# Patient Record
Sex: MALE | Race: WHITE
[De-identification: names, ages, dates, MRNs, and addresses within clinical notes are randomized per-mention and may not be internally consistent; named-entity substitution may affect disease eponyms.]

---

## 2020-09-02 ENCOUNTER — HOSPITAL ENCOUNTER (OUTPATIENT)
Dept: HOSPITAL 92 - CT | Age: 65
Discharge: HOME | End: 2020-09-02
Attending: UROLOGY
Payer: MEDICARE

## 2020-09-02 DIAGNOSIS — C61: Primary | ICD-10-CM

## 2020-09-02 LAB — ESTIMATED GFR-MDRD - POC: 76

## 2020-09-02 PROCEDURE — A9503 TC99M MEDRONATE: HCPCS

## 2020-09-02 PROCEDURE — 82565 ASSAY OF CREATININE: CPT

## 2020-09-02 PROCEDURE — 74178 CT ABD&PLV WO CNTR FLWD CNTR: CPT

## 2020-09-02 PROCEDURE — 78306 BONE IMAGING WHOLE BODY: CPT

## 2020-09-02 NOTE — CT
CT ABDOMEN AND PELVIS WITH AND WITHOUT IV CONTRAST:

 

Date:  09/02/2020

 

HISTORY:  Prostate cancer. Patient had an allergic reaction to the IV contrast, which included hives,
 throat itchiness, and cough. The patient was given Benadryl. 

 

COMPARISON:  None. 

 

FINDINGS:

The lung bases are clear. The liver, spleen, pancreas, and adrenal glands appear normal. No calcified
 gallstones are seen. 

 

No calculi seen in the kidneys, ureters, or the urinary bladder. No hydroureteronephrosis is noted on
 either side. There is a 2.0 cm partially exophytic cyst arising from the left kidney. No enhancing r
enal mass is seen on either side. There is normal contrast excretion into the ureters and the urinary
 bladder. 

 

No free air, free fluid, or lymphadenopathy seen in the abdomen or pelvis. There are vascular calcifi
cations without evidence of aneurysmal dilatation of the abdominal aorta. There are degenerative corral
ges in the spine. No osteolytic or osteoblastic lesions are seen. 

 

There is a duodenal diverticulum. There is dilatation of the third and fourth portions of the duodenu
m on the postcontrast images and not on the precontrast images, likely due to peristalsis. A normal a
ppearing appendix is seen. There are bilateral fat-containing inguinal hernia. There are postop alfaro
es in the lower lumbar spine. 

 

IMPRESSION: 

No evidence of metastatic disease. 

 

 

POS: OFF

## 2020-09-02 NOTE — NM
Radionucleotide bone scan



HISTORY: Prostate cancer. Initial staging.



FINDINGS: Heterogeneous uptake at the shoulders, sternoclavicular joints, ankles and feet. Small foci
 over the groin, not overlying the bone, likely related to urine contamination.



No pathologic uptake.



  



IMPRESSION :

No scintigraphic evidence of osseous metastasis.



Reported By: FELICIA Navarrete 

Electronically Signed:  9/2/2020 1:54 PM

## 2021-01-20 LAB — ESTIMATED GFR-MDRD - POC: 75

## 2021-01-26 ENCOUNTER — HOSPITAL ENCOUNTER (OUTPATIENT)
Dept: HOSPITAL 92 - TBSIIMAG | Age: 66
Discharge: HOME | End: 2021-01-26
Attending: RADIOLOGY
Payer: MEDICARE

## 2021-01-26 DIAGNOSIS — C61: Primary | ICD-10-CM

## 2021-01-26 PROCEDURE — 82565 ASSAY OF CREATININE: CPT

## 2021-01-26 PROCEDURE — 72197 MRI PELVIS W/O & W/DYE: CPT

## 2021-01-27 NOTE — MRI
MR OF THE PELVIS WITH AND WITHOUT CONTRAST



INDICATION: Prostate cancer; pretreatment evaluation



COMPARISON: None



TECHNIQUE: Multiplanar, multisequence MR images were obtained of the pelvis with and without IV contr
ast. 20 cc of MultiHance was utilized for the examination. The examination was reviewed on a

separate [employer] 3-D workstation for multiplanar metric evaluation.



FINDINGS:



Prostate size:  The prostate measured 3.7 x 3.1 x 3.2cm.   20.45 cc. 



Peripheral zone: No area of restricted diffusion is seen within the peripheral zone. There is a heter
ogeneous T2 signal seen within the peripheral zone bilaterally right greater than left. There are

more wedgelike regions of hypointensity seen within the right prostatic base laterally. No circumscri
bed hypointense lesion is evident.



Central zone: No suspicious signal abnormality or focal lesion. There is a central TURP defect presen
t.



Neural vasculature: No evidence of neurovascular invasion



Regional lymphadenopathy: None



Dynamic contrast enhancement: Negative.



Osseous structures: No suspicious osseous lesion is identified. 



Additional findings: None..



IMPRESSION:

1. PIRADS 2- Low (clinically significant cancer is unlikely to be present.)



Reported By: Gerardo Quintero 

Electronically Signed:  1/27/2021 8:12 AM